# Patient Record
Sex: MALE | Race: WHITE | NOT HISPANIC OR LATINO | Employment: FULL TIME | URBAN - METROPOLITAN AREA
[De-identification: names, ages, dates, MRNs, and addresses within clinical notes are randomized per-mention and may not be internally consistent; named-entity substitution may affect disease eponyms.]

---

## 2018-03-14 ENCOUNTER — OFFICE VISIT (OUTPATIENT)
Dept: INTERNAL MEDICINE CLINIC | Facility: CLINIC | Age: 28
End: 2018-03-14
Payer: COMMERCIAL

## 2018-03-14 VITALS
SYSTOLIC BLOOD PRESSURE: 118 MMHG | WEIGHT: 160.8 LBS | OXYGEN SATURATION: 98 % | DIASTOLIC BLOOD PRESSURE: 68 MMHG | BODY MASS INDEX: 25.84 KG/M2 | HEART RATE: 68 BPM | HEIGHT: 66 IN | RESPIRATION RATE: 14 BRPM

## 2018-03-14 DIAGNOSIS — L03.011 PARONYCHIA OF RIGHT MIDDLE FINGER: Primary | ICD-10-CM

## 2018-03-14 PROCEDURE — 3008F BODY MASS INDEX DOCD: CPT | Performed by: INTERNAL MEDICINE

## 2018-03-14 PROCEDURE — 99213 OFFICE O/P EST LOW 20 MIN: CPT | Performed by: INTERNAL MEDICINE

## 2018-03-14 RX ORDER — AZITHROMYCIN 250 MG/1
TABLET, FILM COATED ORAL
Qty: 6 TABLET | Refills: 0 | Status: SHIPPED | OUTPATIENT
Start: 2018-03-14 | End: 2018-03-18

## 2018-03-14 NOTE — PROGRESS NOTES
Assessment/Plan:     Use warm soaks and take the antibiotics  Should resolve on its own after that  Followup scheduled per orders  No problem-specific Assessment & Plan notes found for this encounter  Diagnoses and all orders for this visit:    Paronychia of right middle finger  -     azithromycin (ZITHROMAX) 250 mg tablet; 2 tabs today then 1 tab daily for 4 days          Subjective:      Patient ID: Nadira Jones is a 32 y o  male  Patient comes in today complaining of a few day history of swelling and redness at the base of his nail bed on his right middle finger  He states there was some discharge that drained last night  It felt a little better after that  He states he just recently moved and was carrying a lot of boxes and thinks he might have hit her something  ALLERGIES:  Allergies   Allergen Reactions    Penicillins Hives and Shortness Of Breath    Erythromycin Base Abdominal Pain    Sulfa Antibiotics Hives       CURRENT MEDICATIONS:    Current Outpatient Prescriptions:     azithromycin (ZITHROMAX) 250 mg tablet, 2 tabs today then 1 tab daily for 4 days, Disp: 6 tablet, Rfl: 0    ACTIVE PROBLEM LIST:  There is no problem list on file for this patient  PAST MEDICAL HISTORY:  No past medical history on file  PAST SURGICAL HISTORY:  No past surgical history on file  FAMILY HISTORY:  No family history on file  SOCIAL HISTORY:  Social History     Social History    Marital status: Single     Spouse name: N/A    Number of children: N/A    Years of education: N/A     Occupational History    Not on file  Social History Main Topics    Smoking status: Never Smoker    Smokeless tobacco: Never Used    Alcohol use Yes    Drug use: No    Sexual activity: Not on file     Other Topics Concern    Not on file     Social History Narrative    No narrative on file       Review of Systems   Constitutional: Negative for fever     Musculoskeletal: Negative for joint swelling  Objective:  Vitals:    03/14/18 1438   BP: 118/68   BP Location: Left arm   Patient Position: Sitting   Cuff Size: Adult   Pulse: 68   Resp: 14   SpO2: 98%   Weight: 72 9 kg (160 lb 12 8 oz)   Height: 5' 6" (1 676 m)        Physical Exam   Constitutional: He appears well-developed and well-nourished  Skin:   Swelling and redness at the base of the right middle finger nail bed  No fluctuant mass  Consistent with a paronychia  Nursing note and vitals reviewed  RESULTS:    No results found for this or any previous visit (from the past 1008 hour(s))

## 2019-11-19 ENCOUNTER — TELEPHONE (OUTPATIENT)
Dept: INTERNAL MEDICINE CLINIC | Facility: CLINIC | Age: 29
End: 2019-11-19

## 2019-12-09 ENCOUNTER — OFFICE VISIT (OUTPATIENT)
Dept: INTERNAL MEDICINE CLINIC | Facility: CLINIC | Age: 29
End: 2019-12-09
Payer: COMMERCIAL

## 2019-12-09 VITALS
OXYGEN SATURATION: 99 % | HEART RATE: 60 BPM | BODY MASS INDEX: 25.55 KG/M2 | RESPIRATION RATE: 16 BRPM | SYSTOLIC BLOOD PRESSURE: 116 MMHG | HEIGHT: 66 IN | WEIGHT: 159 LBS | DIASTOLIC BLOOD PRESSURE: 74 MMHG

## 2019-12-09 DIAGNOSIS — R53.83 FATIGUE, UNSPECIFIED TYPE: ICD-10-CM

## 2019-12-09 DIAGNOSIS — Z13.6 SCREENING FOR HEART DISEASE: ICD-10-CM

## 2019-12-09 DIAGNOSIS — Z00.00 ANNUAL PHYSICAL EXAM: Primary | ICD-10-CM

## 2019-12-09 PROCEDURE — 99395 PREV VISIT EST AGE 18-39: CPT | Performed by: PHYSICIAN ASSISTANT

## 2019-12-09 NOTE — PROGRESS NOTES
Assessment/Plan:      Quality Measures: BMI Counseling: Body mass index is 25 66 kg/m²  The BMI is above normal  Nutrition recommendations include decreasing portion sizes, consuming healthier snacks and moderation in carbohydrate intake  Exercise recommendations include exercising 3-5 times per week  No follow-ups on file  Diagnoses and all orders for this visit:    Annual physical exam    Screening for heart disease  -     Comprehensive metabolic panel; Future  -     Lipid panel; Future    Fatigue, unspecified type  -     CBC and differential; Future          Subjective:      Patient ID: Hannah Olivares is a 30 y o  male  23-year-old white male presents for health assessment  He reports his health is good, no focal concerns today, other than occasional tiredness  EMR has been reviewed, clarified, and updated with patient today  ALLERGIES:  Allergies   Allergen Reactions    Penicillins Hives and Shortness Of Breath    Erythromycin Base Abdominal Pain    Sulfa Antibiotics Hives       CURRENT MEDICATIONS:  No current outpatient medications on file  ACTIVE PROBLEM LIST:  There is no problem list on file for this patient        PAST MEDICAL HISTORY:  Past Medical History:   Diagnosis Date    Asthma     Last Assessed: 10/29/2014    GERD (gastroesophageal reflux disease)     Orthostatic syncope     Schatzki's ring        PAST SURGICAL HISTORY:  Past Surgical History:   Procedure Laterality Date    MYRINGOTOMY W/ TUBES      With Ventilating Tube Insertion    TONSILLECTOMY AND ADENOIDECTOMY         FAMILY HISTORY:  Family History   Problem Relation Age of Onset   [place] Breast cancer Mother     Cervical cancer Mother    [place] Melanoma Mother     Hypertension Father     Diabetes Maternal Grandfather     Heart attack Maternal Grandfather     Alcohol abuse Paternal Grandmother     Lung cancer Paternal Grandfather     Ulcerative colitis Sister        SOCIAL HISTORY:  Social History Socioeconomic History    Marital status: Single     Spouse name: Not on file    Number of children: Not on file    Years of education: Not on file    Highest education level: Not on file   Occupational History    Not on file   Social Needs    Financial resource strain: Not on file    Food insecurity:     Worry: Not on file     Inability: Not on file    Transportation needs:     Medical: Not on file     Non-medical: Not on file   Tobacco Use    Smoking status: Never Smoker    Smokeless tobacco: Never Used   Substance and Sexual Activity    Alcohol use: Yes     Drinks per session: 1 or 2     Binge frequency: Weekly     Comment: Beer use     Drug use: No    Sexual activity: Yes     Partners: Female   Lifestyle    Physical activity:     Days per week: Not on file     Minutes per session: Not on file    Stress: Not on file   Relationships    Social connections:     Talks on phone: Not on file     Gets together: Not on file     Attends Buddhist service: Not on file     Active member of club or organization: Not on file     Attends meetings of clubs or organizations: Not on file     Relationship status: Not on file    Intimate partner violence:     Fear of current or ex partner: Not on file     Emotionally abused: Not on file     Physically abused: Not on file     Forced sexual activity: Not on file   Other Topics Concern    Not on file   Social History Narrative    Significant other-engaged     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    Hobbies-recreational sports    Reg dental care-brush teeth twice daily       Review of Systems   Constitutional: Negative for activity change, chills, fatigue and fever  HENT: Negative for congestion  Eyes: Negative for discharge  Respiratory: Negative for cough, chest tightness and shortness of breath  Cardiovascular: Negative for chest pain, palpitations and leg swelling  Gastrointestinal: Negative for abdominal pain     Genitourinary: Negative for difficulty urinating  Musculoskeletal: Negative for arthralgias and myalgias  Skin: Negative for rash  Allergic/Immunologic: Negative for immunocompromised state  Neurological: Negative for dizziness, syncope, weakness, light-headedness and headaches  Hematological: Negative for adenopathy  Does not bruise/bleed easily  Psychiatric/Behavioral: Negative for dysphoric mood, sleep disturbance and suicidal ideas  The patient is not nervous/anxious  Objective:  Vitals:    12/09/19 1321   BP: 116/74   BP Location: Left arm   Patient Position: Sitting   Cuff Size: Adult   Pulse: 60   Resp: 16   SpO2: 99%   Weight: 72 1 kg (159 lb)   Height: 5' 6" (1 676 m)     Body mass index is 25 66 kg/m²  Physical Exam   Constitutional: He is oriented to person, place, and time  He appears well-developed and well-nourished  No distress  HENT:   Head: Normocephalic  Right Ear: External ear normal    Left Ear: External ear normal    Nose: Nose normal    Mouth/Throat: Oropharynx is clear and moist  No oropharyngeal exudate  TMs within normal limits   Eyes: Pupils are equal, round, and reactive to light  Conjunctivae and EOM are normal  Right eye exhibits no discharge  Left eye exhibits no discharge  No scleral icterus  Neck: Normal range of motion  Neck supple  No JVD present  Carotid bruit is not present  No tracheal deviation present  No thyromegaly present  Cardiovascular: Normal rate, regular rhythm, normal heart sounds and intact distal pulses  Exam reveals no gallop and no friction rub  No murmur heard  Pulmonary/Chest: Effort normal and breath sounds normal  No respiratory distress  He has no wheezes  He has no rales  He exhibits no tenderness  Abdominal: Soft  Bowel sounds are normal  He exhibits no distension and no mass  There is no hepatosplenomegaly  There is no tenderness  There is no rebound, no guarding and no CVA tenderness     Genitourinary:   Genitourinary Comments: Circumcised adult male   No lesions of the phallus, scrotum, or testes  No inguinal hernias  Musculoskeletal: Normal range of motion  He exhibits no edema, tenderness or deformity  Lymphadenopathy:     He has no cervical adenopathy  Neurological: He is alert and oriented to person, place, and time  He has normal reflexes  He displays normal reflexes  No cranial nerve deficit or sensory deficit  He exhibits normal muscle tone  Coordination normal    Skin: Skin is warm and dry  No rash noted  Psychiatric: He has a normal mood and affect  His behavior is normal  Judgment and thought content normal    Nursing note and vitals reviewed  RESULTS:    No results found for this or any previous visit (from the past 1008 hour(s))  This note was created with voice recognition software  Phonic, grammatical and spelling errors may be present within the note as a result

## 2019-12-09 NOTE — PATIENT INSTRUCTIONS
General medical exam is excellent  Encouraged good diet and continued exercise  Will have screening labs done and report the results when they are available  Recommend follow-up for yearly exam in 1 year

## 2023-10-13 ENCOUNTER — APPOINTMENT (OUTPATIENT)
Dept: LAB | Facility: HOSPITAL | Age: 33
End: 2023-10-13
Payer: COMMERCIAL

## 2023-10-13 ENCOUNTER — TELEPHONE (OUTPATIENT)
Dept: OBGYN CLINIC | Facility: CLINIC | Age: 33
End: 2023-10-13

## 2023-10-13 DIAGNOSIS — Z13.71 SCREENING FOR GENETIC DISEASE CARRIER STATUS: ICD-10-CM

## 2023-10-13 DIAGNOSIS — Z31.440 ENCOUNTER OF MALE FOR TESTING FOR GENETIC DISEASE CARRIER STATUS FOR PROCREATIVE MANAGEMENT: Primary | ICD-10-CM

## 2023-10-13 DIAGNOSIS — Z31.440 ENCOUNTER OF MALE FOR TESTING FOR GENETIC DISEASE CARRIER STATUS FOR PROCREATIVE MANAGEMENT: ICD-10-CM

## 2023-10-13 PROCEDURE — 36415 COLL VENOUS BLD VENIPUNCTURE: CPT

## 2023-10-13 NOTE — TELEPHONE ENCOUNTER
Spoke to patients insurance regarding PA for Cystis Fibrosis. Patients wife is a positive carrier. CPT code 33917 does not require a PA. Patient can go tomorrow to lab and get it done.

## 2023-10-20 LAB
CF COMMENT: NORMAL
CFTR MUT ANL BLD/T: NORMAL

## 2023-11-02 ENCOUNTER — CLINICAL SUPPORT (OUTPATIENT)
Age: 33
End: 2023-11-02

## 2023-11-02 DIAGNOSIS — Z32.2 ENCOUNTER FOR CHILDBIRTH INSTRUCTION: Primary | ICD-10-CM

## 2023-11-07 RX ORDER — SODIUM FLUORIDE1.1%, POTASSIUM NITRATE 5% 5.8; 57.5 MG/ML; MG/ML
GEL, DENTIFRICE DENTAL
COMMUNITY
Start: 2023-10-23 | End: 2023-11-08

## 2023-11-07 RX ORDER — DOXYCYCLINE HYCLATE 100 MG
TABLET ORAL
COMMUNITY
Start: 2023-08-09 | End: 2023-11-08

## 2023-11-07 RX ORDER — PANTOPRAZOLE SODIUM 40 MG/10ML
INJECTION, POWDER, LYOPHILIZED, FOR SOLUTION INTRAVENOUS
COMMUNITY

## 2023-11-07 RX ORDER — PANTOPRAZOLE SODIUM 40 MG/1
40 TABLET, DELAYED RELEASE ORAL DAILY
COMMUNITY
Start: 2023-11-02 | End: 2023-11-08

## 2023-11-07 RX ORDER — TRIAMCINOLONE ACETONIDE 1 MG/G
CREAM TOPICAL
COMMUNITY
Start: 2023-08-09 | End: 2023-11-08

## 2023-11-07 RX ORDER — OMEPRAZOLE 40 MG/1
CAPSULE, DELAYED RELEASE ORAL
COMMUNITY
Start: 2023-10-12 | End: 2023-11-08

## 2023-11-08 ENCOUNTER — OFFICE VISIT (OUTPATIENT)
Dept: GASTROENTEROLOGY | Facility: CLINIC | Age: 33
End: 2023-11-08
Payer: COMMERCIAL

## 2023-11-08 VITALS
DIASTOLIC BLOOD PRESSURE: 74 MMHG | WEIGHT: 166.4 LBS | HEART RATE: 67 BPM | HEIGHT: 67 IN | SYSTOLIC BLOOD PRESSURE: 123 MMHG | BODY MASS INDEX: 26.12 KG/M2 | OXYGEN SATURATION: 98 %

## 2023-11-08 DIAGNOSIS — K21.9 GASTROESOPHAGEAL REFLUX DISEASE, UNSPECIFIED WHETHER ESOPHAGITIS PRESENT: Primary | ICD-10-CM

## 2023-11-08 PROCEDURE — 99203 OFFICE O/P NEW LOW 30 MIN: CPT | Performed by: PHYSICIAN ASSISTANT

## 2023-11-08 NOTE — PATIENT INSTRUCTIONS
Scheduled date of EGD(as of today):11/21/23  Physician performing EGD:Nakul  Location of EGD:[place]  Instructions reviewed with patient by:Maya MCCONNELL  Clearances:  none

## 2023-11-08 NOTE — H&P (VIEW-ONLY)
[facility] - Outpatient Consultation  Gina Conte 26 y.o. male MRN: [id number]  Encounter: [id number]          ASSESSMENT AND PLAN:      1. Gastroesophageal reflux disease, unspecified whether esophagitis present  Off and on symptoms for years but more persistent over the past 1 year  On Pantoprazole 40mg daily which almost completely resolves his symptoms  Urea breath testing for Hpylori last March was positive; stool was negative but patient notes being on a PPI at the time of testing    Will plan EGD  Will biopsy for Hpylori    EGD in 2014 (performed due to hematemesis) showed evidence of EoE  Patient has no dysphagia    ______________________________________________________________________    HPI: 49-year-old male with no significant past medical history who presents for evaluation of acid reflux. The patient reports that although he has had symptoms off and on over the past few years over the past 1 year the symptoms have been more persistent. He reports that he has almost every day. He for spoke to his family doctor about this in the beginning of this year. He was started on omeprazole and advised to have H. pylori breath a of stool testing. I am able to review his breath testing which was positive. I cannot see the stool testing but he reports he was told it was negative. He admits that he was on omeprazole when he submitted the stool sample. He reports that he came off of omeprazole successfully but within several weeks the symptoms returned. Since that time he has been on pantoprazole. He has tried to stop this on several occasions with immediate return of symptoms. He reports that his main symptom is heartburn. This symptom is most significant at night while lying down. He has no dysphagia, hematemesis or melena. He reports that in 2014 he was at a football game and he was drinking alcohol. He started vomiting and vomited blood.   He went to the emergency room where he was started on a PPI and advised to follow-up with gastroenterology which he did. He had an upper endoscopy. I was able to review this report. Linear referrals were noted in the esophagus. Biopsies were taken of the esophagus stomach and small bowel. Esophagus biopsies did not show evidence of eosinophilic esophagitis. Stomach biopsies were negative for H. pylori. REVIEW OF SYSTEMS:    CONSTITUTIONAL: Denies any fever, chills, rigors, and weight loss. HEENT: No earache or tinnitus. Denies hearing loss or visual disturbances. CARDIOVASCULAR: No chest pain or palpitations. RESPIRATORY: Denies any cough, hemoptysis, shortness of breath or dyspnea on exertion. GASTROINTESTINAL: As noted in the History of Present Illness. GENITOURINARY: No problems with urination. Denies any hematuria or dysuria. NEUROLOGIC: No dizziness or vertigo, denies headaches. MUSCULOSKELETAL: Denies any muscle or joint pain. SKIN: Denies skin rashes or itching. ENDOCRINE: Denies excessive thirst. Denies intolerance to heat or cold. PSYCHOSOCIAL: Denies depression or anxiety. Denies any recent memory loss.        Historical Information   Past Medical History:   Diagnosis Date    Asthma     Last Assessed: 10/29/2014    GERD (gastroesophageal reflux disease)     Orthostatic syncope     Schatzki's ring      Past Surgical History:   Procedure Laterality Date    MYRINGOTOMY W/ TUBES      With Ventilating Tube Insertion    TONSILLECTOMY AND ADENOIDECTOMY       Social History   Social History     Substance and Sexual Activity   Alcohol Use Yes    Comment: Beer use      Social History     Substance and Sexual Activity   Drug Use No     Social History     Tobacco Use   Smoking Status Never   Smokeless Tobacco Never     Family History   Problem Relation Age of Onset    Breast cancer Mother     Cervical cancer Mother     Melanoma Mother     Hypertension Father     Diabetes Maternal Grandfather     Heart attack Maternal Grandfather     Alcohol abuse Paternal Grandmother     Lung cancer Paternal Grandfather     Ulcerative colitis Sister        Meds/Allergies       Current Outpatient Medications:     pantoprazole (Protonix) 40 mg injection    Allergies   Allergen Reactions    Penicillins Hives and Shortness Of Breath    Erythromycin Base Abdominal Pain    Sulfa Antibiotics Hives           Objective     Blood pressure 123/74, pulse 67, height 5' 7" (1.702 m), weight 75.5 kg (166 lb 6.4 oz), SpO2 98 %. Body mass index is 26.06 kg/m². PHYSICAL EXAM:      General Appearance:   Alert, cooperative, no distress   HEENT:   Normocephalic, atraumatic, anicteric. Neck:  Supple, symmetrical, trachea midline   Lungs:   Clear to auscultation bilaterally; no rales, rhonchi or wheezing; respirations unlabored    Heart[de-identified]   Regular rate and rhythm; no murmur, rub, or gallop. Abdomen:   Soft, non-tender, non-distended; normal bowel sounds; no masses, no organomegaly    Genitalia:   Deferred    Rectal:   Deferred    Extremities:  No cyanosis, clubbing or edema    Pulses:  2+ and symmetric    Skin:  No jaundice, rashes, or lesions    Lymph nodes:  No palpable cervical lymphadenopathy        Lab Results:   No visits with results within 1 Day(s) from this visit. Latest known visit with results is:   Appointment on 10/13/2023   Component Date Value    Cystic Fibrosis Screen 10/13/2023 Comment:     CF COMMENT 10/13/2023 Comment          Radiology Results:   No results found.   Answers submitted by the patient for this visit:  Abdominal Pain Questionnaire (Submitted on 11/8/2023)  Chief Complaint: Abdominal pain  Progression since onset: resolved  Pain location: epigastric region  Pain - numeric: 3/10  Pain quality: burning  Radiates to: does not radiate  anorexia: Yes  arthralgias: No  belching: No  constipation: No  diarrhea: No  dysuria: No  fever: No  flatus: No  frequency: No  headaches: No  hematochezia: No  hematuria: No  melena: No  myalgias: No  nausea:  No  weight loss: No  vomiting: No  Aggravated by: eating  Relieved by: nothing

## 2023-11-08 NOTE — LETTER
November 10, 2023     Azul Leyva MD  [address] [place] [address]    Patient: Jam Morillo   YOB: 1990   Date of Visit: 11/8/2023       Dear Dr. Antione Tsang:    Thank you for referring Jam Morillo to me for evaluation. Below are my notes for this consultation. If you have questions, please do not hesitate to call me. I look forward to following your patient along with you. Sincerely,        Nayan De La Torre PA-C        CC: No Recipients    Brigitte Bocanegra  11/8/2023  3:23 PM  Signed  [facility] - Outpatient Consultation  Jam Morillo 26 y.o. male MRN: [id number]  Encounter: [id number]          ASSESSMENT AND PLAN:      1. Gastroesophageal reflux disease, unspecified whether esophagitis present  Off and on symptoms for years but more persistent over the past 1 year  On Pantoprazole 40mg daily which almost completely resolves his symptoms  Urea breath testing for Hpylori last March was positive; stool was negative but patient notes being on a PPI at the time of testing    Will plan EGD  Will biopsy for Hpylori    EGD in 2014 (performed due to hematemesis) showed evidence of EoE  Patient has no dysphagia    ______________________________________________________________________    HPI: 15-year-old male with no significant past medical history who presents for evaluation of acid reflux. The patient reports that although he has had symptoms off and on over the past few years over the past 1 year the symptoms have been more persistent. He reports that he has almost every day. He for spoke to his family doctor about this in the beginning of this year. He was started on omeprazole and advised to have H. pylori breath a of stool testing. I am able to review his breath testing which was positive. I cannot see the stool testing but he reports he was told it was negative.   He admits that he was on omeprazole when he submitted the stool sample. He reports that he came off of omeprazole successfully but within several weeks the symptoms returned. Since that time he has been on pantoprazole. He has tried to stop this on several occasions with immediate return of symptoms. He reports that his main symptom is heartburn. This symptom is most significant at night while lying down. He has no dysphagia, hematemesis or melena. He reports that in 2014 he was at a football game and he was drinking alcohol. He started vomiting and vomited blood. He went to the emergency room where he was started on a PPI and advised to follow-up with gastroenterology which he did. He had an upper endoscopy. I was able to review this report. Linear referrals were noted in the esophagus. Biopsies were taken of the esophagus stomach and small bowel. Esophagus biopsies did not show evidence of eosinophilic esophagitis. Stomach biopsies were negative for H. pylori. REVIEW OF SYSTEMS:    CONSTITUTIONAL: Denies any fever, chills, rigors, and weight loss. HEENT: No earache or tinnitus. Denies hearing loss or visual disturbances. CARDIOVASCULAR: No chest pain or palpitations. RESPIRATORY: Denies any cough, hemoptysis, shortness of breath or dyspnea on exertion. GASTROINTESTINAL: As noted in the History of Present Illness. GENITOURINARY: No problems with urination. Denies any hematuria or dysuria. NEUROLOGIC: No dizziness or vertigo, denies headaches. MUSCULOSKELETAL: Denies any muscle or joint pain. SKIN: Denies skin rashes or itching. ENDOCRINE: Denies excessive thirst. Denies intolerance to heat or cold. PSYCHOSOCIAL: Denies depression or anxiety. Denies any recent memory loss.        Historical Information  Past Medical History:   Diagnosis Date   • Asthma     Last Assessed: 10/29/2014   • GERD (gastroesophageal reflux disease)    • Orthostatic syncope    • Schatzki's ring      Past Surgical History:   Procedure Laterality Date • MYRINGOTOMY W/ TUBES      With Ventilating Tube Insertion   • TONSILLECTOMY AND ADENOIDECTOMY       Social History  Social History     Substance and Sexual Activity   Alcohol Use Yes    Comment: Beer use      Social History     Substance and Sexual Activity   Drug Use No     Social History     Tobacco Use   Smoking Status Never   Smokeless Tobacco Never     Family History   Problem Relation Age of Onset   • Breast cancer Mother    • Cervical cancer Mother    • Melanoma Mother    • Hypertension Father    • Diabetes Maternal Grandfather    • Heart attack Maternal Grandfather    • Alcohol abuse Paternal Grandmother    • Lung cancer Paternal Grandfather    • Ulcerative colitis Sister        Meds/Allergies      Current Outpatient Medications:   •  pantoprazole (Protonix) 40 mg injection    Allergies   Allergen Reactions   • Penicillins Hives and Shortness Of Breath   • Erythromycin Base Abdominal Pain   • Sulfa Antibiotics Hives           Objective    Blood pressure 123/74, pulse 67, height 5' 7" (1.702 m), weight 75.5 kg (166 lb 6.4 oz), SpO2 98 %. Body mass index is 26.06 kg/m². PHYSICAL EXAM:      General Appearance:   Alert, cooperative, no distress   HEENT:   Normocephalic, atraumatic, anicteric. Neck:  Supple, symmetrical, trachea midline   Lungs:   Clear to auscultation bilaterally; no rales, rhonchi or wheezing; respirations unlabored    Heart[de-identified]   Regular rate and rhythm; no murmur, rub, or gallop. Abdomen:   Soft, non-tender, non-distended; normal bowel sounds; no masses, no organomegaly    Genitalia:   Deferred    Rectal:   Deferred    Extremities:  No cyanosis, clubbing or edema    Pulses:  2+ and symmetric    Skin:  No jaundice, rashes, or lesions    Lymph nodes:  No palpable cervical lymphadenopathy        Lab Results:   No visits with results within 1 Day(s) from this visit.    Latest known visit with results is:   Appointment on 10/13/2023   Component Date Value   • Cystic Fibrosis Screen 10/13/2023 Comment:    • CF COMMENT 10/13/2023 Comment          Radiology Results:   No results found. Answers submitted by the patient for this visit:  Abdominal Pain Questionnaire (Submitted on 11/8/2023)  Chief Complaint: Abdominal pain  Progression since onset: resolved  Pain location: epigastric region  Pain - numeric: 3/10  Pain quality: burning  Radiates to: does not radiate  anorexia: Yes  arthralgias: No  belching: No  constipation: No  diarrhea: No  dysuria: No  fever: No  flatus: No  frequency: No  headaches: No  hematochezia: No  hematuria: No  melena: No  myalgias: No  nausea:  No  weight loss: No  vomiting: No  Aggravated by: eating  Relieved by: nothing

## 2023-11-08 NOTE — PROGRESS NOTES
[facility] - Outpatient Consultation  Ele Baxter 26 y.o. male MRN: [id number]  Encounter: [id number]          ASSESSMENT AND PLAN:      1. Gastroesophageal reflux disease, unspecified whether esophagitis present  Off and on symptoms for years but more persistent over the past 1 year  On Pantoprazole 40mg daily which almost completely resolves his symptoms  Urea breath testing for Hpylori last March was positive; stool was negative but patient notes being on a PPI at the time of testing    Will plan EGD  Will biopsy for Hpylori    EGD in 2014 (performed due to hematemesis) showed evidence of EoE  Patient has no dysphagia    ______________________________________________________________________    HPI: 18-year-old male with no significant past medical history who presents for evaluation of acid reflux. The patient reports that although he has had symptoms off and on over the past few years over the past 1 year the symptoms have been more persistent. He reports that he has almost every day. He for spoke to his family doctor about this in the beginning of this year. He was started on omeprazole and advised to have H. pylori breath a of stool testing. I am able to review his breath testing which was positive. I cannot see the stool testing but he reports he was told it was negative. He admits that he was on omeprazole when he submitted the stool sample. He reports that he came off of omeprazole successfully but within several weeks the symptoms returned. Since that time he has been on pantoprazole. He has tried to stop this on several occasions with immediate return of symptoms. He reports that his main symptom is heartburn. This symptom is most significant at night while lying down. He has no dysphagia, hematemesis or melena. He reports that in 2014 he was at a football game and he was drinking alcohol. He started vomiting and vomited blood.   He went to the emergency room where he was started on a PPI and advised to follow-up with gastroenterology which he did. He had an upper endoscopy. I was able to review this report. Linear referrals were noted in the esophagus. Biopsies were taken of the esophagus stomach and small bowel. Esophagus biopsies did not show evidence of eosinophilic esophagitis. Stomach biopsies were negative for H. pylori. REVIEW OF SYSTEMS:    CONSTITUTIONAL: Denies any fever, chills, rigors, and weight loss. HEENT: No earache or tinnitus. Denies hearing loss or visual disturbances. CARDIOVASCULAR: No chest pain or palpitations. RESPIRATORY: Denies any cough, hemoptysis, shortness of breath or dyspnea on exertion. GASTROINTESTINAL: As noted in the History of Present Illness. GENITOURINARY: No problems with urination. Denies any hematuria or dysuria. NEUROLOGIC: No dizziness or vertigo, denies headaches. MUSCULOSKELETAL: Denies any muscle or joint pain. SKIN: Denies skin rashes or itching. ENDOCRINE: Denies excessive thirst. Denies intolerance to heat or cold. PSYCHOSOCIAL: Denies depression or anxiety. Denies any recent memory loss.        Historical Information   Past Medical History:   Diagnosis Date    Asthma     Last Assessed: 10/29/2014    GERD (gastroesophageal reflux disease)     Orthostatic syncope     Schatzki's ring      Past Surgical History:   Procedure Laterality Date    MYRINGOTOMY W/ TUBES      With Ventilating Tube Insertion    TONSILLECTOMY AND ADENOIDECTOMY       Social History   Social History     Substance and Sexual Activity   Alcohol Use Yes    Comment: Beer use      Social History     Substance and Sexual Activity   Drug Use No     Social History     Tobacco Use   Smoking Status Never   Smokeless Tobacco Never     Family History   Problem Relation Age of Onset    Breast cancer Mother     Cervical cancer Mother     Melanoma Mother     Hypertension Father     Diabetes Maternal Grandfather     Heart attack Maternal Grandfather     Alcohol abuse Paternal Grandmother     Lung cancer Paternal Grandfather     Ulcerative colitis Sister        Meds/Allergies       Current Outpatient Medications:     pantoprazole (Protonix) 40 mg injection    Allergies   Allergen Reactions    Penicillins Hives and Shortness Of Breath    Erythromycin Base Abdominal Pain    Sulfa Antibiotics Hives           Objective     Blood pressure 123/74, pulse 67, height 5' 7" (1.702 m), weight 75.5 kg (166 lb 6.4 oz), SpO2 98 %. Body mass index is 26.06 kg/m². PHYSICAL EXAM:      General Appearance:   Alert, cooperative, no distress   HEENT:   Normocephalic, atraumatic, anicteric. Neck:  Supple, symmetrical, trachea midline   Lungs:   Clear to auscultation bilaterally; no rales, rhonchi or wheezing; respirations unlabored    Heart[de-identified]   Regular rate and rhythm; no murmur, rub, or gallop. Abdomen:   Soft, non-tender, non-distended; normal bowel sounds; no masses, no organomegaly    Genitalia:   Deferred    Rectal:   Deferred    Extremities:  No cyanosis, clubbing or edema    Pulses:  2+ and symmetric    Skin:  No jaundice, rashes, or lesions    Lymph nodes:  No palpable cervical lymphadenopathy        Lab Results:   No visits with results within 1 Day(s) from this visit. Latest known visit with results is:   Appointment on 10/13/2023   Component Date Value    Cystic Fibrosis Screen 10/13/2023 Comment:     CF COMMENT 10/13/2023 Comment          Radiology Results:   No results found.   Answers submitted by the patient for this visit:  Abdominal Pain Questionnaire (Submitted on 11/8/2023)  Chief Complaint: Abdominal pain  Progression since onset: resolved  Pain location: epigastric region  Pain - numeric: 3/10  Pain quality: burning  Radiates to: does not radiate  anorexia: Yes  arthralgias: No  belching: No  constipation: No  diarrhea: No  dysuria: No  fever: No  flatus: No  frequency: No  headaches: No  hematochezia: No  hematuria: No  melena: No  myalgias: No  nausea:  No  weight loss: No  vomiting: No  Aggravated by: eating  Relieved by: nothing

## 2023-11-21 ENCOUNTER — ANESTHESIA EVENT (OUTPATIENT)
Dept: GASTROENTEROLOGY | Facility: HOSPITAL | Age: 33
End: 2023-11-21

## 2023-11-21 ENCOUNTER — HOSPITAL ENCOUNTER (OUTPATIENT)
Dept: GASTROENTEROLOGY | Facility: HOSPITAL | Age: 33
Setting detail: OUTPATIENT SURGERY
Discharge: HOME | End: 2023-11-21
Payer: COMMERCIAL

## 2023-11-21 ENCOUNTER — ANESTHESIA (OUTPATIENT)
Dept: GASTROENTEROLOGY | Facility: HOSPITAL | Age: 33
End: 2023-11-21

## 2023-11-21 VITALS
BODY MASS INDEX: 25.61 KG/M2 | OXYGEN SATURATION: 98 % | RESPIRATION RATE: 18 BRPM | HEIGHT: 67 IN | SYSTOLIC BLOOD PRESSURE: 115 MMHG | TEMPERATURE: 97.5 F | HEART RATE: 61 BPM | WEIGHT: 163.14 LBS | DIASTOLIC BLOOD PRESSURE: 74 MMHG

## 2023-11-21 DIAGNOSIS — K21.9 GASTROESOPHAGEAL REFLUX DISEASE, UNSPECIFIED WHETHER ESOPHAGITIS PRESENT: ICD-10-CM

## 2023-11-21 PROCEDURE — 88305 TISSUE EXAM BY PATHOLOGIST: CPT | Performed by: PATHOLOGY

## 2023-11-21 PROCEDURE — 43239 EGD BIOPSY SINGLE/MULTIPLE: CPT | Performed by: INTERNAL MEDICINE

## 2023-11-21 RX ORDER — PROPOFOL 10 MG/ML
INJECTION, EMULSION INTRAVENOUS AS NEEDED
Status: DISCONTINUED | OUTPATIENT
Start: 2023-11-21 | End: 2023-11-21

## 2023-11-21 RX ORDER — LIDOCAINE HYDROCHLORIDE 20 MG/ML
INJECTION, SOLUTION EPIDURAL; INFILTRATION; INTRACAUDAL; PERINEURAL AS NEEDED
Status: DISCONTINUED | OUTPATIENT
Start: 2023-11-21 | End: 2023-11-21

## 2023-11-21 RX ORDER — SODIUM CHLORIDE, SODIUM LACTATE, POTASSIUM CHLORIDE, CALCIUM CHLORIDE 600; 310; 30; 20 MG/100ML; MG/100ML; MG/100ML; MG/100ML
INJECTION, SOLUTION INTRAVENOUS CONTINUOUS PRN
Status: DISCONTINUED | OUTPATIENT
Start: 2023-11-21 | End: 2023-11-21

## 2023-11-21 RX ADMIN — SODIUM CHLORIDE, SODIUM LACTATE, POTASSIUM CHLORIDE, AND CALCIUM CHLORIDE: .6; .31; .03; .02 INJECTION, SOLUTION INTRAVENOUS at 13:15

## 2023-11-21 RX ADMIN — PROPOFOL 50 MG: 10 INJECTION, EMULSION INTRAVENOUS at 13:21

## 2023-11-21 RX ADMIN — PROPOFOL 150 MG: 10 INJECTION, EMULSION INTRAVENOUS at 13:18

## 2023-11-21 RX ADMIN — LIDOCAINE HYDROCHLORIDE 100 MG: 20 INJECTION, SOLUTION EPIDURAL; INFILTRATION; INTRACAUDAL; PERINEURAL at 13:18

## 2023-11-21 RX ADMIN — PROPOFOL 50 MG: 10 INJECTION, EMULSION INTRAVENOUS at 13:23

## 2023-11-21 RX ADMIN — PROPOFOL 50 MG: 10 INJECTION, EMULSION INTRAVENOUS at 13:19

## 2023-11-21 NOTE — ANESTHESIA PREPROCEDURE EVALUATION
Procedure:  EGD    Relevant Problems   No relevant active problems        Physical Exam    Airway    Mallampati score: II  TM Distance: >3 FB  Neck ROM: full     Dental       Cardiovascular  Cardiovascular exam normal    Pulmonary  Pulmonary exam normal     Other Findings      History Comments   Asthma Last Assessed: 10/29/2014   GERD (gastroesophageal reflux disease)    Orthostatic syncope    Schatzki's ring        Anesthesia Plan  ASA Score- 2     Anesthesia Type- IV sedation with anesthesia with ASA Monitors. Additional Monitors:     Airway Plan:            Plan Factors-Exercise tolerance (METS): >4 METS. Chart reviewed. EKG reviewed. Imaging results reviewed. Existing labs reviewed. Patient summary reviewed. Induction- intravenous. Postoperative Plan-     Informed Consent- Anesthetic plan and risks discussed with patient. I personally reviewed this patient with the CRNA. Discussed and agreed on the Anesthesia Plan with the CRNA. Unique Amaya

## 2023-11-21 NOTE — INTERVAL H&P NOTE
H&P reviewed. After examining the patient I find no changes in the patients condition since the H&P had been written.     Vitals:    11/21/23 1239   BP: 129/79   Pulse: 65   Resp: 16   Temp: 97.6 °F (36.4 °C)   SpO2: 100%

## 2023-11-21 NOTE — ANESTHESIA POSTPROCEDURE EVALUATION
Post-Op Assessment Note    CV Status:  Stable  Pain Score: 0    Pain management: adequate       Mental Status:  Alert and awake   Hydration Status:  Euvolemic   PONV Controlled:  Controlled   Airway Patency:  Patent and adequate  Two or more mitigation strategies used for obstructive sleep apnea   Post Op Vitals Reviewed: Yes    No anethesia notable event occurred.     Staff: CRNA               BP   106/88   Temp      Pulse 67   Resp 20   SpO2 100

## 2023-11-27 PROCEDURE — 88305 TISSUE EXAM BY PATHOLOGIST: CPT | Performed by: PATHOLOGY

## 2025-04-24 ENCOUNTER — OFFICE VISIT (OUTPATIENT)
Dept: URGENT CARE | Facility: CLINIC | Age: 35
End: 2025-04-24
Payer: COMMERCIAL

## 2025-04-24 VITALS
DIASTOLIC BLOOD PRESSURE: 62 MMHG | OXYGEN SATURATION: 98 % | TEMPERATURE: 98.1 F | HEIGHT: 67 IN | HEART RATE: 60 BPM | BODY MASS INDEX: 24.8 KG/M2 | WEIGHT: 158 LBS | SYSTOLIC BLOOD PRESSURE: 118 MMHG | RESPIRATION RATE: 18 BRPM

## 2025-04-24 DIAGNOSIS — B96.89 BACTERIAL URI: Primary | ICD-10-CM

## 2025-04-24 DIAGNOSIS — J06.9 BACTERIAL URI: Primary | ICD-10-CM

## 2025-04-24 PROCEDURE — 99213 OFFICE O/P EST LOW 20 MIN: CPT | Performed by: FAMILY MEDICINE

## 2025-04-24 RX ORDER — DOXYCYCLINE 100 MG/1
100 TABLET ORAL 2 TIMES DAILY
Qty: 14 TABLET | Refills: 0 | Status: SHIPPED | OUTPATIENT
Start: 2025-04-24 | End: 2025-05-01

## 2025-04-24 RX ORDER — FAMOTIDINE 20 MG/1
20 TABLET, FILM COATED ORAL 2 TIMES DAILY
COMMUNITY

## 2025-04-24 NOTE — PROGRESS NOTES
"  [facility]        NAME: Star Caruso is a 34 y.o. male  : 1990    MRN: [id number]  DATE: 2025  TIME: 5:29 PM    Assessment and Plan   Bacterial URI [J06.9, B96.89]  1. Bacterial URI  doxycycline (ADOXA) 100 MG tablet        Suspicious for seasonal allergies.  Patient advised on initiating an antihistamine such as Claritin, Allegra or Zyrtec for at least 2 weeks with Flonase as an initial bridge.  If ineffective, patient may initiate antibiotic for possible bacterial URI.  Advised on supportive measures including gargling with warm salt water and hydrating plenty fluids.    Patient Instructions     Follow up with PCP in 3-5 days.  Proceed to  ER if symptoms worsen.    If tests have been performed at [facility], our office will contact you with results if changes need to be made to the care plan discussed with you at the visit.  You can review your full results on [facility] MyChart.    Chief Complaint     Chief Complaint   Patient presents with    Cold Like Symptoms     The patient C/O of persistent cough, nasal congestion, sinus congestion and \"coughing yellow phlegm.\" Symptoms for three weeks. Used Tessalon Pearls and nasal spray but not helping.         History of Present Illness       34-year-old male presents today with about 3 weeks of URI symptoms including nasal congestion, postnasal drip, productive coughing and chest congestion.  Denies any obvious fevers, chills, chest pain, abdominal symptoms, dizziness or headache.  Reports that his child had similar symptoms prior to the onset of his.  Was evaluated via telehealth about 1.5 weeks ago and prescribed Tessalon Perles and Flonase which have been ineffective.      Review of Systems   Review of Systems   Constitutional:  Negative for chills and fever.   HENT:  Positive for congestion and postnasal drip. Negative for rhinorrhea and sore throat.    Respiratory:  Positive for cough and shortness of breath (chest congestion).  " "  Cardiovascular:  Negative for chest pain.   Gastrointestinal:  Negative for abdominal pain and nausea.   Neurological:  Negative for dizziness and headaches.     Current Medications       Current Outpatient Medications:     doxycycline (ADOXA) 100 MG tablet, Take 1 tablet (100 mg total) by mouth 2 (two) times a day for 7 days, Disp: 14 tablet, Rfl: 0    famotidine (PEPCID) 20 mg tablet, Take 20 mg by mouth 2 (two) times a day, Disp: , Rfl:     pantoprazole (Protonix) 40 mg injection, , Disp: , Rfl:     Current Allergies     Allergies as of 04/24/2025 - Reviewed 11/21/2023   Allergen Reaction Noted    Penicillins Hives and Shortness Of Breath 10/29/2014    Erythromycin base Abdominal Pain 05/29/2009    Sulfa antibiotics Hives 05/29/2009            The following portions of the patient's history were reviewed and updated as appropriate: allergies, current medications, past family history, past medical history, past social history, past surgical history and problem list.     Past Medical History:   Diagnosis Date    Asthma     Last Assessed: 10/29/2014    GERD (gastroesophageal reflux disease)     Orthostatic syncope     Schatzki's ring        Past Surgical History:   Procedure Laterality Date    MYRINGOTOMY W/ TUBES      With Ventilating Tube Insertion    TONSILLECTOMY AND ADENOIDECTOMY         Family History   Problem Relation Age of Onset    Breast cancer Mother     Cervical cancer Mother     Melanoma Mother     Hypertension Father     Diabetes Maternal Grandfather     Heart attack Maternal Grandfather     Alcohol abuse Paternal Grandmother     Lung cancer Paternal Grandfather     Ulcerative colitis Sister          Medications have been verified.        Objective   /62   Pulse 60   Temp 98.1 °F (36.7 °C)   Resp 18   Ht 5' 7\" (1.702 m)   Wt 71.7 kg (158 lb)   SpO2 98%   BMI 24.75 kg/m²   No LMP for male patient.       Physical Exam     Physical Exam  Vitals and nursing note reviewed.   Constitutional:  "      General: He is in acute distress.      Appearance: Normal appearance. He is normal weight. He is not ill-appearing, toxic-appearing or diaphoretic.   HENT:      Head: Normocephalic and atraumatic.      Nose: Congestion and rhinorrhea present.      Comments: Inflamed nasal mucosa     Mouth/Throat:      Mouth: Mucous membranes are moist.      Pharynx: No posterior oropharyngeal erythema.   Eyes:      General:         Right eye: No discharge.         Left eye: No discharge.      Conjunctiva/sclera: Conjunctivae normal.   Cardiovascular:      Rate and Rhythm: Normal rate and regular rhythm.   Pulmonary:      Effort: Pulmonary effort is normal. No respiratory distress.      Breath sounds: Normal breath sounds. No wheezing, rhonchi or rales.   Skin:     General: Skin is warm.      Findings: No erythema.   Neurological:      General: No focal deficit present.      Mental Status: He is alert and oriented to person, place, and time.   Psychiatric:         Mood and Affect: Mood normal.         Behavior: Behavior normal.         Thought Content: Thought content normal.         Judgment: Judgment normal.